# Patient Record
(demographics unavailable — no encounter records)

---

## 2024-11-20 NOTE — HISTORY OF PRESENT ILLNESS
[TextBox_42] : Ms. Pelletier is a 74 y.o female who presents for kidney transplant evaluation.  Pt started dialysis June 2024, she still urinates.  She has a permacath and an immature AVF.  Nephrologist is Dr. Franklin.   Pt used to have diabetes and currently not on any medications.  Pt was diagnosed 20 years ago, she was never on insulin.  She may have mild retinopathy and mild neuropathy.  Denies CAD, no stents.  Had an angiogram once.  She has a history of mitral regurgitation but stable.  No strokes. She walks over a mile, at least 30 minutes per day.  She has been diagnosed with schizophrenia at the age of 18 or 19.  She is stable on olanzapine.  She has been stable for many years and follows with psychiatry.    She has had UTI's in the past, no recent infections.  No transfusion, no children.     Meds:  olanzapine 75mgs daily, vitamin D3, Atorvastatin 10mgs daiy, docosate, aspirin 81mgs, veltassa.  Social history:  currently lives by herself.  Would live with her niece after transplantation.  She used to smoke, quit in 1992.  No alcohol use, no drug or marijuana use.   surgical history: pilonidal cyst, AVF, rectal surgery  Family history:  No family history of kidney disease.

## 2024-11-20 NOTE — ASSESSMENT
[Fair candidate] : a fair candidate. We should proceed with our protocol for evaluation for kidney transplantation. [FreeTextEntry1] : 74 year old female DM and ESRD and psychiatric history lives alone but seems to have some family support no cardiac history

## 2024-11-20 NOTE — PLAN
[FreeTextEntry1] : 1.  ESRD - Ms. Pelletier is a reasonable candidate for kidney transplantation.  Although she is 74 she does not have cardiac disease and is active.  No living donors at this time. Would avoid prednisone in immunosuppression due to psychiatric history.   2.  DM2 - currently diet controlled, check A1c.  3.  CV - no history of CAD.  Will need echocardiogram and stress test.  4.  Psych - hx of controlled schizophrenia on olanzipine.  Will need psychiatry evaluation and clearance.  5.  Cancer screening - mammogram, colonoscopy 6.  Imaging - CT a/p   I have personally discussed the risks and benefits of transplantation and patient attended transplant education class where the following was disclosed:   Reviewed factors affecting survival and morbidity while on dialysis, the transplant wait list and reviewed ranjeet-operative and long-term risk factors affecting outcome in kidney transplantation.     One year SRTR outcomes for national and [facility] were discussed in regards to patient survival and graft survival after transplantation.     Details of transplant surgery, including complications were discussed. Immunosuppression and complications including infection including life threatening sepsis and opportunistic infections, malignancy and new onset diabetes were discussed.     Benefits of live donor transplantation as well as variability in wait times across regions and multiple listing were discussed.  KDPI >85% and PHS high risk criteria donors were discussed.  HCV kidney transplantation was discussed.   Will proceed with completing/ updating work up and listing for transplant/ live donor transplant once work up is reviewed and found to be acceptable by multidisciplinary listing committee.

## 2024-11-20 NOTE — END OF VISIT
[Time Spent: ___ minutes] : I have spent [unfilled] minutes of time on the encounter which excludes teaching and separately reported services. [Attestation] : We have discussed with the patient at length the risks and benefits of transplantation. The patient is aware that transplantation is a process that requires a life-long commitment, and that it is a personal choice to proceed with it rather than to remain with alternative treatments such as dialysis.  We discussed the differences in quality of life and patient survival between remaining on dialysis versus receiving a kidney transplant. We also discussed increased benefits of receiving a live donor kidney transplant versus a  donor kidney transplant. We discussed the risks of kidney transplantation in depth. Surgical risks included but were not limited to bleeding, infection, graft thrombosis, ureteral and vascular strictures, delayed graft function, urine leak, the development of fluid collections, cardiac events, damage to native blood vessels and potential need for re operation postoperatively. We also discussed the risks of postoperative immunosuppression including but not limited to increased risk of infection, cancer, weight gain, new onset or worsening of diabetes or hypertension on a temporary or permanent basis, water retention, back pain, constipation, diarrhea, dizziness, headache, joint pain, loss of appetite, nausea, stomach pain or upset, trouble sleeping, vomiting, and/or mental or mood changes were.  The patient also understands that there is a risk of recurrent primary kidney disease in the transplanted organ. We also discussed the risks and benefits of receiving a kidney from a donor with a Kidney Donor Profile Index (KDPI) score greater than 85% including a greater risk of delayed graft function, increased need for dialysis within the first 2-3 weeks after transplant, and statistically lower graft survival at 3 years. We discussed the one-year observed and expected patient and graft survival rates in comparison to the national one-year averages as described in the evaluation consent forms. Patient consent is in the chart. Patient is aware that they may withdraw their consent for transplantation at any time. I have answered all of the patient's questions as well as all questions of those present with the patient.  At the culmination of the patient's transplant candidacy workup, the patient will be presented to the Multidisciplinary Transplant Selection Committee for a decision whether to proceed with listing and transplantation.

## 2024-11-20 NOTE — CONSULT LETTER
[Dear  ___] : Dear  [unfilled], [Consult Letter:] : I had the pleasure of evaluating your patient, [unfilled]. [Please see my note below.] : Please see my note below. [Consult Closing:] : Thank you very much for allowing me to participate in the care of this patient.  If you have any questions, please do not hesitate to contact me. [Sincerely,] : Sincerely, [FreeTextEntry3] : Tre Jaquez, DO

## 2024-11-20 NOTE — PHYSICAL EXAM
[General Appearance - Alert] : alert [General Appearance - In No Acute Distress] : in no acute distress [General Appearance - Well Nourished] : well nourished [Sclera] : the sclera and conjunctiva were normal [Extraocular Movements] : extraocular movements were intact [Strabismus] : no strabismus was seen [Outer Ear] : the ears and nose were normal in appearance [Hearing Threshold Finger Rub Not [place]] : hearing was normal [Nasal Cavity] : the nasal mucosa and septum were normal [Neck Appearance] : the appearance of the neck was normal [Neck Cervical Mass (___cm)] : no neck mass was observed [Respiration, Rhythm And Depth] : normal respiratory rhythm and effort [Auscultation Breath Sounds / Voice Sounds] : lungs were clear to auscultation bilaterally [Heart Rate And Rhythm] : heart rate was normal and rhythm regular [Heart Sounds] : normal S1 and S2 [Murmurs] : no murmurs [Bowel Sounds] : normal bowel sounds [Abdomen Soft] : soft [Abdomen Tenderness] : non-tender [Cervical Lymph Nodes Enlarged Posterior Bilaterally] : posterior cervical [Cervical Lymph Nodes Enlarged Anterior Bilaterally] : anterior cervical [Supraclavicular Lymph Nodes Enlarged Bilaterally] : supraclavicular [Abnormal Walk] : normal gait [Nail Clubbing] : no clubbing  or cyanosis of the fingernails [Musculoskeletal - Swelling] : no joint swelling seen [Skin Color & Pigmentation] : normal skin color and pigmentation [] : no rash [Skin Turgor] : normal skin turgor [Cranial Nerves] : cranial nerves 2-12 were intact [Sensation] : the sensory exam was normal to light touch and pinprick [Motor Exam] : the motor exam was normal [Oriented To Time, Place, And Person] : oriented to person, place, and time [Impaired Insight] : insight and judgment were intact [Affect] : the affect was normal

## 2024-11-20 NOTE — HISTORY OF PRESENT ILLNESS
[Diabetes Mellitus] : Diabetes Mellitus [de-identified] : 74 year old female with ESRD on HD since June 2024 dialysis via permacath; had multiple fistulas but all failed DM; for many years; used to be on oral meds now diet controlled schizophrenia; diagnosed many years ago; admitted few times to mental health institutions ; last admission more than 30 years ago possibly had a stroke 10 years ago; admitted few days; no neuro deficits told she has "mitral regurgitation". asymptomatic makes normal amounts of urine walks half an hour daily  PSH pilonidal cyst many years ago rectal prolapse 3 years ago avf  Social history lives alone, have close family members stopped smoking 1992